# Patient Record
Sex: FEMALE | Race: BLACK OR AFRICAN AMERICAN
[De-identification: names, ages, dates, MRNs, and addresses within clinical notes are randomized per-mention and may not be internally consistent; named-entity substitution may affect disease eponyms.]

---

## 2018-05-22 ENCOUNTER — HOSPITAL ENCOUNTER (EMERGENCY)
Dept: HOSPITAL 63 - ER | Age: 23
Discharge: HOME | End: 2018-05-22
Payer: COMMERCIAL

## 2018-05-22 VITALS — DIASTOLIC BLOOD PRESSURE: 77 MMHG | SYSTOLIC BLOOD PRESSURE: 116 MMHG

## 2018-05-22 VITALS — BODY MASS INDEX: 23.15 KG/M2 | WEIGHT: 135.58 LBS | HEIGHT: 64 IN

## 2018-05-22 DIAGNOSIS — N94.6: Primary | ICD-10-CM

## 2018-05-22 DIAGNOSIS — F12.10: ICD-10-CM

## 2018-05-22 LAB
AMORPH SED URNS QL MICRO: PRESENT /HPF
APTT PPP: YELLOW S
BACTERIA #/AREA URNS HPF: 0 /HPF
BILIRUB UR QL STRIP: (no result)
FIBRINOGEN PPP-MCNC: CLEAR MG/DL
GLUCOSE UR STRIP-MCNC: (no result) MG/DL
NITRITE UR QL STRIP: (no result)
SP GR UR STRIP: 1.02
SQUAMOUS #/AREA URNS LPF: (no result) /LPF
UROBILINOGEN UR-MCNC: 0.2 MG/DL
WBC #/AREA URNS HPF: (no result) /HPF (ref 0–4)

## 2018-05-22 PROCEDURE — 99284 EMERGENCY DEPT VISIT MOD MDM: CPT

## 2018-05-22 PROCEDURE — 87591 N.GONORRHOEAE DNA AMP PROB: CPT

## 2018-05-22 PROCEDURE — 87491 CHLMYD TRACH DNA AMP PROBE: CPT

## 2018-05-22 PROCEDURE — 36415 COLL VENOUS BLD VENIPUNCTURE: CPT

## 2018-05-22 PROCEDURE — 81025 URINE PREGNANCY TEST: CPT

## 2018-05-22 PROCEDURE — 81001 URINALYSIS AUTO W/SCOPE: CPT

## 2018-05-22 NOTE — PHYS DOC
Past History


Past Medical History:  No Pertinent History


Past Surgical History:  No Surgical History


Alcohol Use:  None


Drug Use:  Marijuana





Adult General


Chief Complaint


Chief Complaint:  ABDOMINAL PAIN





HPI


HPI





Patient is a 22 year old female who presents with complaint of lower abdominal 

pain. Patient states that her symptoms started 4 days ago. The patient states 

that she has been having trouble with lower abdominal pain and cramping around 

her menstrual cycles. Her last menstrual cycle started 4 days ago. The patient 

states that she recently delivered her third baby and has been recently having 

more painful menstrual cycles postpartum. The patient states that she has been 

following Dr. Nguyen's office and was started on Depo-Provera and was also 

given Toradol to help with her pain. Patient states her pain started again 

today. Patient states that she's had associated vomiting and diarrhea. Patient 

denies any nausea currently. Patient states that her pain is slightly better at 

this time compared to yesterday, rating it as 5 out of 10. Patient states that 

she is still having menstrual bleeding but states that it is less today than it 

has been from the start. Denies any fevers. Patient is not sure if she has a 

sexually transmitted infection and states that this could be a possibility.





Review of Systems


Review of Systems





Constitutional: Denies fever or chills []


Eyes: Denies change in visual acuity, redness, or eye pain []


HENT: Denies nasal congestion or sore throat []


Respiratory: Denies cough or shortness of breath []


Cardiovascular: Denies chest pain or edema[]


GI: Lower abdominal and pelvic pain, vomiting, diarrhea[]


: Denies dysuria or hematuria []


Musculoskeletal: Denies back pain or joint pain []


Integument: Denies rash or skin lesions []


Neurologic: Denies headache, focal weakness or sensory changes []








All other systems were reviewed and found to be within normal limits, except as 

documented in this note.





Allergies


Allergies





Allergies








Coded Allergies Type Severity Reaction Last Updated Verified


 


  No Known Drug Allergies    11/14/15 No











Physical Exam


Physical Exam





Constitutional: Well developed, well nourished, no acute distress, non-toxic 

appearance. []


HENT: Normocephalic, atraumatic, bilateral external ears normal, oropharynx 

moist, no oral exudates, nose normal. []


Eyes: PERRLA, EOMI, conjunctiva normal, no discharge. [] 


Neck: Normal range of motion, no tenderness, supple, no stridor. [] 


Cardiovascular:Heart rate regular rhythm, no murmur []


Lungs & Thorax:  Bilateral breath sounds clear to auscultation []


Abdomen: Bowel sounds normal, soft, no tenderness, no masses, no pulsatile 

masses. 


Pelvic: Normal external exam, mild amount of menstrual bleeding noted from 

cervical os, cervical os closed, no cervical motion tenderness, no midline or 

bilateral adnexal tenderness on bimanual exam.[] 


Skin: Warm, dry, no erythema, no rash. [] 


Back: No tenderness, no CVA tenderness. [] 


Extremities: No tenderness, no cyanosis, no clubbing, ROM intact, no edema. [] 


Neurologic: Alert and oriented X 3, normal motor function, normal sensory 

function, no focal deficits noted. []





Current Patient Data


Vital Signs





 Vital Signs








  Date Time  Temp Pulse Resp B/P (MAP) Pulse Ox O2 Delivery O2 Flow Rate FiO2


 


5/22/18 17:35 98.6 68 18  98 Room Air  








Lab Results





Laboratory Tests








Test


 5/22/18


17:33 5/22/18


18:20


 


Bedside Urine HCG, Qualitative hcg negative  


 


Urine Collection Type  Unknown 


 


Urine Color  Yellow 


 


Urine Clarity  Clear 


 


Urine pH  6.0 


 


Urine Specific Gravity  1.020 


 


Urine Protein  30 mg/dl 


 


Urine Glucose (UA)  Neg mg/dL 


 


Urine Ketones (Stick)  Neg mg/dL 


 


Urine Blood  Large 


 


Urine Nitrite  Neg 


 


Urine Bilirubin  Neg 


 


Urine Urobilinogen Dipstick  0.2 mg/dL 


 


Urine Leukocyte Esterase  Neg 


 


Urine RBC  11-20 /HPF 


 


Urine WBC  1-4 /HPF 


 


Urine Squamous Epithelial


Cells 


 Occ /LPF 





 


Urine Amorphous Sediment  Present /HPF 


 


Urine Bacteria  0 /HPF 


 


Urine Mucus  Mod /LPF 








Current Medications








 Medications


  (Trade)  Dose


 Ordered  Sig/Ana Maria


 Route


 PRN Reason  Start Time


 Stop Time Status Last Admin


Dose Admin


 


 Ibuprofen


  (Motrin)  600 mg  1X  ONCE


 PO


   5/22/18 18:30


 5/22/18 18:31 DC 5/22/18 18:44














EKG


EKG


Not performed[]





Radiology/Procedures


Radiology/Procedures


Not performed[]





Course & Med Decision Making


Course & Med Decision Making


Pertinent Labs and Imaging studies reviewed. (See chart for details)





Patient's pelvic exam is benign. The patient's symptoms appear consistent with 

dysmenorrhea. Patient treated with oral ibuprofen in the emergency department. 

We'll continue patient on ibuprofen treatment at home with recommended follow-

up with patient's OB/GYN in the next 5 days for reevaluation. Advised return 

emergency department for any worsening symptoms. Patient voiced understanding 

and in agreement with treatment plan.





Dragon Disclaimer


Dragon Disclaimer


This electronic medical record was generated, in whole or in part, using a 

voice recognition dictation system.





Departure


Departure:


Impression:  


 Primary Impression:  


 Dysmenorrhea


Disposition:  01 HOME, SELF-CARE


Condition:  IMPROVED


Referrals:  


PCP,DILLON (PCP)


Patient Instructions:  Dysmenorrhea





Additional Instructions:  


Follow-up with your OB/GYN in the next 5 days for reevaluation. Return to the 

emergency department for any worsening symptoms.


Scripts


Ibuprofen (IBUPROFEN) 600 Mg Tablet


600 MG PO Q6HRS PRN for PAIN, #30 TAB


   Prov: DONAL ENG MD         5/22/18











DONAL ENG MD May 22, 2018 18:21

## 2020-03-17 ENCOUNTER — HOSPITAL ENCOUNTER (EMERGENCY)
Dept: HOSPITAL 63 - ER | Age: 25
Discharge: HOME | End: 2020-03-17
Payer: COMMERCIAL

## 2020-03-17 VITALS
SYSTOLIC BLOOD PRESSURE: 107 MMHG | BODY MASS INDEX: 21.19 KG/M2 | WEIGHT: 124.12 LBS | DIASTOLIC BLOOD PRESSURE: 60 MMHG | HEIGHT: 64 IN

## 2020-03-17 DIAGNOSIS — N39.0: Primary | ICD-10-CM

## 2020-03-17 LAB
APTT PPP: YELLOW S
BACTERIA #/AREA URNS HPF: (no result) /HPF
BILIRUB UR QL STRIP: (no result)
FIBRINOGEN PPP-MCNC: (no result) MG/DL
GLUCOSE UR STRIP-MCNC: (no result) MG/DL
NITRITE UR QL STRIP: (no result)
RBC #/AREA URNS HPF: (no result) /HPF (ref 0–2)
SP GR UR STRIP: 1.02
SQUAMOUS #/AREA URNS LPF: (no result) /LPF
UROBILINOGEN UR-MCNC: 0.2 MG/DL
WBC #/AREA URNS HPF: (no result) /HPF (ref 0–4)

## 2020-03-17 PROCEDURE — 81025 URINE PREGNANCY TEST: CPT

## 2020-03-17 PROCEDURE — 99283 EMERGENCY DEPT VISIT LOW MDM: CPT

## 2020-03-17 PROCEDURE — 87086 URINE CULTURE/COLONY COUNT: CPT

## 2020-03-17 PROCEDURE — 81001 URINALYSIS AUTO W/SCOPE: CPT
